# Patient Record
Sex: FEMALE | Race: WHITE | Employment: FULL TIME | URBAN - METROPOLITAN AREA
[De-identification: names, ages, dates, MRNs, and addresses within clinical notes are randomized per-mention and may not be internally consistent; named-entity substitution may affect disease eponyms.]

---

## 2018-01-17 PROBLEM — M25.551 RIGHT HIP PAIN: Status: ACTIVE | Noted: 2018-01-17

## 2018-01-17 PROBLEM — M54.41 ACUTE RIGHT-SIDED LOW BACK PAIN WITH RIGHT-SIDED SCIATICA: Status: ACTIVE | Noted: 2018-01-17

## 2019-07-08 PROBLEM — Z80.0 FAMILY HISTORY OF COLON CANCER: Status: ACTIVE | Noted: 2019-07-08

## 2019-08-22 PROBLEM — Z87.19 HISTORY OF HEMORRHOIDS: Status: ACTIVE | Noted: 2019-08-22

## 2023-05-08 ENCOUNTER — PATIENT MESSAGE (OUTPATIENT)
Dept: FAMILY MEDICINE CLINIC | Facility: CLINIC | Age: 58
End: 2023-05-08

## 2023-06-07 ENCOUNTER — OFFICE VISIT (OUTPATIENT)
Dept: FAMILY MEDICINE CLINIC | Facility: CLINIC | Age: 58
End: 2023-06-07
Payer: COMMERCIAL

## 2023-06-07 VITALS
RESPIRATION RATE: 16 BRPM | WEIGHT: 195 LBS | HEIGHT: 59.75 IN | OXYGEN SATURATION: 98 % | DIASTOLIC BLOOD PRESSURE: 82 MMHG | HEART RATE: 91 BPM | SYSTOLIC BLOOD PRESSURE: 124 MMHG | BODY MASS INDEX: 38.28 KG/M2

## 2023-06-07 DIAGNOSIS — Z23 NEED FOR DIPHTHERIA-TETANUS-PERTUSSIS (TDAP) VACCINE: ICD-10-CM

## 2023-06-07 DIAGNOSIS — Z12.31 ENCOUNTER FOR SCREENING MAMMOGRAM FOR BREAST CANCER: ICD-10-CM

## 2023-06-07 DIAGNOSIS — F17.200 VAPING NICOTINE DEPENDENCE, NON-TOBACCO PRODUCT: ICD-10-CM

## 2023-06-07 DIAGNOSIS — Z00.00 LABORATORY EXAMINATION ORDERED AS PART OF A ROUTINE GENERAL MEDICAL EXAMINATION: ICD-10-CM

## 2023-06-07 DIAGNOSIS — Z23 NEED FOR SHINGLES VACCINE: ICD-10-CM

## 2023-06-07 DIAGNOSIS — Z00.00 WELLNESS EXAMINATION: Primary | ICD-10-CM

## 2023-06-07 DIAGNOSIS — R73.09 ABNORMAL GLUCOSE: ICD-10-CM

## 2023-06-07 DIAGNOSIS — R63.2 BINGE EATING: ICD-10-CM

## 2023-06-07 PROCEDURE — 3008F BODY MASS INDEX DOCD: CPT | Performed by: FAMILY MEDICINE

## 2023-06-07 PROCEDURE — 3074F SYST BP LT 130 MM HG: CPT | Performed by: FAMILY MEDICINE

## 2023-06-07 PROCEDURE — 90472 IMMUNIZATION ADMIN EACH ADD: CPT | Performed by: FAMILY MEDICINE

## 2023-06-07 PROCEDURE — 90750 HZV VACC RECOMBINANT IM: CPT | Performed by: FAMILY MEDICINE

## 2023-06-07 PROCEDURE — 3079F DIAST BP 80-89 MM HG: CPT | Performed by: FAMILY MEDICINE

## 2023-06-07 PROCEDURE — 99386 PREV VISIT NEW AGE 40-64: CPT | Performed by: FAMILY MEDICINE

## 2023-06-07 PROCEDURE — 90471 IMMUNIZATION ADMIN: CPT | Performed by: FAMILY MEDICINE

## 2023-06-07 PROCEDURE — 90715 TDAP VACCINE 7 YRS/> IM: CPT | Performed by: FAMILY MEDICINE

## 2023-06-19 ENCOUNTER — LAB ENCOUNTER (OUTPATIENT)
Dept: LAB | Age: 58
End: 2023-06-19
Attending: FAMILY MEDICINE
Payer: COMMERCIAL

## 2023-06-19 DIAGNOSIS — Z00.00 LABORATORY EXAMINATION ORDERED AS PART OF A ROUTINE GENERAL MEDICAL EXAMINATION: ICD-10-CM

## 2023-06-19 DIAGNOSIS — R73.09 ABNORMAL GLUCOSE: ICD-10-CM

## 2023-06-19 LAB
ALBUMIN SERPL-MCNC: 3.7 G/DL (ref 3.4–5)
ALBUMIN/GLOB SERPL: 0.9 {RATIO} (ref 1–2)
ALP LIVER SERPL-CCNC: 64 U/L
ALT SERPL-CCNC: 27 U/L
ANION GAP SERPL CALC-SCNC: 3 MMOL/L (ref 0–18)
AST SERPL-CCNC: 27 U/L (ref 15–37)
BASOPHILS # BLD AUTO: 0.05 X10(3) UL (ref 0–0.2)
BASOPHILS NFR BLD AUTO: 1.1 %
BILIRUB SERPL-MCNC: 0.7 MG/DL (ref 0.1–2)
BUN BLD-MCNC: 13 MG/DL (ref 7–18)
CALCIUM BLD-MCNC: 8.6 MG/DL (ref 8.5–10.1)
CHLORIDE SERPL-SCNC: 107 MMOL/L (ref 98–112)
CHOLEST SERPL-MCNC: 180 MG/DL (ref ?–200)
CO2 SERPL-SCNC: 28 MMOL/L (ref 21–32)
CREAT BLD-MCNC: 0.95 MG/DL
EOSINOPHIL # BLD AUTO: 0.2 X10(3) UL (ref 0–0.7)
EOSINOPHIL NFR BLD AUTO: 4.6 %
ERYTHROCYTE [DISTWIDTH] IN BLOOD BY AUTOMATED COUNT: 13.6 %
EST. AVERAGE GLUCOSE BLD GHB EST-MCNC: 114 MG/DL (ref 68–126)
FASTING PATIENT LIPID ANSWER: YES
FASTING STATUS PATIENT QL REPORTED: YES
GFR SERPLBLD BASED ON 1.73 SQ M-ARVRAT: 70 ML/MIN/1.73M2 (ref 60–?)
GLOBULIN PLAS-MCNC: 3.9 G/DL (ref 2.8–4.4)
GLUCOSE BLD-MCNC: 112 MG/DL (ref 70–99)
HBA1C MFR BLD: 5.6 % (ref ?–5.7)
HCT VFR BLD AUTO: 43.2 %
HDLC SERPL-MCNC: 49 MG/DL (ref 40–59)
HGB BLD-MCNC: 14.7 G/DL
IMM GRANULOCYTES # BLD AUTO: 0.02 X10(3) UL (ref 0–1)
IMM GRANULOCYTES NFR BLD: 0.5 %
LDLC SERPL CALC-MCNC: 108 MG/DL (ref ?–100)
LYMPHOCYTES # BLD AUTO: 1.33 X10(3) UL (ref 1–4)
LYMPHOCYTES NFR BLD AUTO: 30.4 %
MCH RBC QN AUTO: 31 PG (ref 26–34)
MCHC RBC AUTO-ENTMCNC: 34 G/DL (ref 31–37)
MCV RBC AUTO: 91.1 FL
MONOCYTES # BLD AUTO: 0.51 X10(3) UL (ref 0.1–1)
MONOCYTES NFR BLD AUTO: 11.6 %
NEUTROPHILS # BLD AUTO: 2.27 X10 (3) UL (ref 1.5–7.7)
NEUTROPHILS # BLD AUTO: 2.27 X10(3) UL (ref 1.5–7.7)
NEUTROPHILS NFR BLD AUTO: 51.8 %
NONHDLC SERPL-MCNC: 131 MG/DL (ref ?–130)
OSMOLALITY SERPL CALC.SUM OF ELEC: 287 MOSM/KG (ref 275–295)
PLATELET # BLD AUTO: 182 10(3)UL (ref 150–450)
POTASSIUM SERPL-SCNC: 4.2 MMOL/L (ref 3.5–5.1)
PROT SERPL-MCNC: 7.6 G/DL (ref 6.4–8.2)
RBC # BLD AUTO: 4.74 X10(6)UL
SODIUM SERPL-SCNC: 138 MMOL/L (ref 136–145)
TRIGL SERPL-MCNC: 128 MG/DL (ref 30–149)
TSI SER-ACNC: 1.48 MIU/ML (ref 0.36–3.74)
VLDLC SERPL CALC-MCNC: 22 MG/DL (ref 0–30)
WBC # BLD AUTO: 4.4 X10(3) UL (ref 4–11)

## 2023-06-19 PROCEDURE — 80061 LIPID PANEL: CPT | Performed by: FAMILY MEDICINE

## 2023-06-19 PROCEDURE — 80050 GENERAL HEALTH PANEL: CPT | Performed by: FAMILY MEDICINE

## 2023-06-19 PROCEDURE — 83036 HEMOGLOBIN GLYCOSYLATED A1C: CPT | Performed by: FAMILY MEDICINE

## 2023-06-20 ENCOUNTER — HOSPITAL ENCOUNTER (OUTPATIENT)
Dept: MAMMOGRAPHY | Age: 58
Discharge: HOME | End: 2023-06-20
Attending: FAMILY MEDICINE
Payer: COMMERCIAL

## 2023-06-20 DIAGNOSIS — Z12.31 ENCOUNTER FOR SCREENING MAMMOGRAM FOR BREAST CANCER: ICD-10-CM

## 2023-06-20 PROCEDURE — 77067 SCR MAMMO BI INCL CAD: CPT | Performed by: FAMILY MEDICINE

## 2023-06-20 PROCEDURE — 77063 BREAST TOMOSYNTHESIS BI: CPT | Performed by: FAMILY MEDICINE

## 2023-07-18 ENCOUNTER — TELEPHONE (OUTPATIENT)
Dept: FAMILY MEDICINE CLINIC | Facility: CLINIC | Age: 58
End: 2023-07-18

## 2023-07-18 NOTE — TELEPHONE ENCOUNTER
Spoke w/ pt. Started to not feel well yesterday. Positive home COVID test today. Reports cough, congestion, body aches, fatigue, and feeling feverish. Denies SOB or CP. Supportive care provided. ER precautions reviewed. Encouraged to call back w/ other questions or concerns.

## 2023-07-18 NOTE — TELEPHONE ENCOUNTER
Pt tested positive for covid today 7/18, was feeling sick yesterday. Asking if she needs a virtual or can we just send a medication?

## 2023-10-12 ENCOUNTER — OFFICE VISIT (OUTPATIENT)
Dept: FAMILY MEDICINE CLINIC | Facility: CLINIC | Age: 58
End: 2023-10-12
Payer: COMMERCIAL

## 2023-10-12 VITALS
HEIGHT: 59.75 IN | BODY MASS INDEX: 39.85 KG/M2 | OXYGEN SATURATION: 98 % | HEART RATE: 95 BPM | SYSTOLIC BLOOD PRESSURE: 116 MMHG | RESPIRATION RATE: 16 BRPM | DIASTOLIC BLOOD PRESSURE: 68 MMHG | WEIGHT: 203 LBS

## 2023-10-12 DIAGNOSIS — R51.9 ACUTE NONINTRACTABLE HEADACHE, UNSPECIFIED HEADACHE TYPE: Primary | ICD-10-CM

## 2023-10-12 DIAGNOSIS — Z23 NEED FOR SHINGLES VACCINE: ICD-10-CM

## 2023-10-12 PROCEDURE — 3078F DIAST BP <80 MM HG: CPT | Performed by: FAMILY MEDICINE

## 2023-10-12 PROCEDURE — 3074F SYST BP LT 130 MM HG: CPT | Performed by: FAMILY MEDICINE

## 2023-10-12 PROCEDURE — 90471 IMMUNIZATION ADMIN: CPT | Performed by: FAMILY MEDICINE

## 2023-10-12 PROCEDURE — 90750 HZV VACC RECOMBINANT IM: CPT | Performed by: FAMILY MEDICINE

## 2023-10-12 PROCEDURE — 99213 OFFICE O/P EST LOW 20 MIN: CPT | Performed by: FAMILY MEDICINE

## 2023-10-12 PROCEDURE — 3008F BODY MASS INDEX DOCD: CPT | Performed by: FAMILY MEDICINE

## 2023-10-13 ENCOUNTER — TELEPHONE (OUTPATIENT)
Dept: FAMILY MEDICINE CLINIC | Facility: CLINIC | Age: 58
End: 2023-10-13

## 2023-10-13 NOTE — TELEPHONE ENCOUNTER
ART sent to [facility] fax # [phone] requesting medical records. Success confirmation was received.

## 2023-10-16 ENCOUNTER — MED REC SCAN ONLY (OUTPATIENT)
Dept: FAMILY MEDICINE CLINIC | Facility: CLINIC | Age: 58
End: 2023-10-16

## 2024-04-24 ENCOUNTER — TELEMEDICINE (OUTPATIENT)
Dept: TELEHEALTH | Age: 59
End: 2024-04-24
Payer: COMMERCIAL

## 2024-04-24 DIAGNOSIS — L23.7 POISON IVY DERMATITIS: Primary | ICD-10-CM

## 2024-04-24 PROCEDURE — 99212 OFFICE O/P EST SF 10 MIN: CPT | Performed by: PHYSICIAN ASSISTANT

## 2024-04-24 RX ORDER — TRIAMCINOLONE ACETONIDE 5 MG/G
1 CREAM TOPICAL 2 TIMES DAILY
Qty: 15 G | Refills: 0 | Status: SHIPPED | OUTPATIENT
Start: 2024-04-24

## 2024-04-24 RX ORDER — PREDNISONE 20 MG/1
40 TABLET ORAL DAILY
Qty: 10 TABLET | Refills: 0 | Status: SHIPPED | OUTPATIENT
Start: 2024-04-24 | End: 2024-04-29

## 2024-04-24 NOTE — PROGRESS NOTES
Virtual/Telephone Check-In    Myranda Turk verbally consents to a Virtual/Telephone Check-In service on 04/24/24.  Patient has been referred to the [facility] website at [email or web address] to review the yearly Consent to Treat document.  Patient understands and accepts financial responsibility for any deductible, co-insurance and/or co-pays associated with this service.       Telehealth Verbal Consent   I conducted a telehealth visit with Myranda Turk today, 04/24/24, which was completed using two-way, real-time interactive audio and video communication. This has been done in good meño to provide continuity of care in the best interest of the provider-patient relationship, due to the COVID - public health crisis/national emergency where restrictions of face-to-face office visits are ongoing. Every conscious effort was taken to allow for sufficient and adequate time to complete the visit.  The patient was made aware of the limitations of the telehealth visit, including treatment limitations as no physical exam could be performed.  The patient was advised to call 911 or to go to the ER in case there was an emergency.  The patient was also advised of the potential privacy & security concerns related to the telehealth platform.   The patient was made aware of where to find [facility]'s notice of privacy practices, telehealth consent form and other related consent forms and documents.  which are located on the [facility] website. The patient verbally agreed to telehealth consent form, related consents and the risks discussed.    Lastly, the patient confirmed that they were in [place].   Included in this visit, time may have been spent reviewing labs, medications, radiology tests and decision making. Appropriate medical decision-making and tests are ordered as detailed in the plan of care above.  Coding/billing information is submitted for this visit based on complexity of care and/or time spent for the visit.    CHIEF  COMPLAINT:  Chief Complaint   Patient presents with    Rash       HPI:  Myranda Turk is a 59 year old female who presents for a video visit.  Patient reports rash for 3 days.   Rash has been spreading since onset.  Patient has had similar rash in the past. The rash is characterized by itchy red bumps and blisters. The affected locations include L forearm and both ankles. Patient has treated rash with hydrocortisone cream and calamine lotion.  Associated symptoms include: pruritus. Denies pain or tenderness  Exposure: pt was pulling weeds outdoors 3 days ago and came into contact with poison ivy.  no pus,  no blood,  no discharge.      Pertinent negatives include no anorexia, congestion, cough, diarrhea, eye pain, facial edema, fatigue, fever, joint pain, rhinorrhea, swollen throat or tongue, shortness of breath, sore throat or vomiting.    Current Outpatient Medications   Medication Sig Dispense Refill    triamcinolone 0.5 % External Cream Apply 1 Application topically 2 (two) times daily. 15 g 0    predniSONE 20 MG Oral Tab Take 2 tablets (40 mg total) by mouth daily for 5 days. 10 tablet 0     Past Medical History:    Acute right-sided low back pain with right-sided sciatica    Family history of colon cancer    Mother    History of hemorrhoids     Past Surgical History:   Procedure Laterality Date    Colonoscopy  2019    Normal colon, hemorrhoid banding ×1    Hysterectomy        02    Tubal ligation         Social History     Socioeconomic History    Marital status:    Tobacco Use    Smoking status: Former    Smokeless tobacco: Never    Tobacco comments:     quit 2 years ago   Vaping Use    Vaping status: Every Day    Substances: Nicotine    Devices: Pre-filled or refillable cartridge   Substance and Sexual Activity    Alcohol use: Yes     Comment: socially    Drug use: No   Other Topics Concern    Caffeine Concern No    Exercise No    Seat Belt No    Special Diet No    Stress Concern No     Weight Concern No        REVIEW OF SYSTEMS:  GENERAL: feels well otherwise, no fever, no chills.  SKIN: Per HPI. No edema. No ulcerations.  HEENT: Denies rhinorrhea, edema of the lips or swelling of throat.  CARDIOVASCULAR: Denies chest pains or palpitations.  LUNGS: Denies shortness of breath with exertion or rest. No cough or wheezing.  LYMPH: Denies enlargement of the lymph nodes.  NEURO: Denies abnormal sensation, tingling of the skin, or numbness.    EXAM:  General: Alert, Well-appearing, and In no acute distress  Respiratory:   Speaking in full sentences comfortably  Normal work of breathing  No cough during visit  Head: Normocephalic  Nose: No obvious nasal discharge.  Skin: erythematous papulovesicular rash noted to small area on L forearm and bilateral ankles. No facial swelling.     No results found for this or any previous visit (from the past 24 hour(s)).    ASSESSMENT AND PLAN:  Myranda Turk is a 59 year old female who presents with symptoms that are consistent with    ASSESSMENT:   Encounter Diagnosis   Name Primary?    Poison ivy dermatitis Yes       PLAN: Meds as below.  Discussed use of cream first since rash is localized to those areas. If worsening or spreading my start oral steroid. See patient Instructions    Meds & Refills for this Visit:  Requested Prescriptions     Signed Prescriptions Disp Refills    triamcinolone 0.5 % External Cream 15 g 0     Sig: Apply 1 Application topically 2 (two) times daily.    predniSONE 20 MG Oral Tab 10 tablet 0     Sig: Take 2 tablets (40 mg total) by mouth daily for 5 days.       Risks, benefits, and side effects of medication explained and discussed.    There are no Patient Instructions on file for this visit.    The patient indicates understanding of these issues and agrees to the plan.  The patient is asked to f/u with PCP or Derm if sx's persist or worsen.    Myranda Turk understands video visit evaluation is not a substitute for face-to-face  examination or emergency care. Patient advised to go to ER or call 911 for worsening symptoms or acute distress.

## 2025-07-21 ENCOUNTER — OFFICE VISIT (OUTPATIENT)
Dept: FAMILY MEDICINE CLINIC | Facility: CLINIC | Age: 60
End: 2025-07-21
Payer: COMMERCIAL

## 2025-07-21 VITALS
DIASTOLIC BLOOD PRESSURE: 76 MMHG | HEART RATE: 87 BPM | HEIGHT: 59.75 IN | BODY MASS INDEX: 40.44 KG/M2 | RESPIRATION RATE: 18 BRPM | WEIGHT: 206 LBS | OXYGEN SATURATION: 97 % | SYSTOLIC BLOOD PRESSURE: 126 MMHG

## 2025-07-21 DIAGNOSIS — M65.30 TRIGGER FINGER OF RIGHT HAND, UNSPECIFIED FINGER: ICD-10-CM

## 2025-07-21 DIAGNOSIS — Z12.11 SCREEN FOR COLON CANCER: ICD-10-CM

## 2025-07-21 DIAGNOSIS — Z00.00 LABORATORY EXAM ORDERED AS PART OF ROUTINE GENERAL MEDICAL EXAMINATION: ICD-10-CM

## 2025-07-21 DIAGNOSIS — Z23 NEED FOR PNEUMOCOCCAL 20-VALENT CONJUGATE VACCINATION: ICD-10-CM

## 2025-07-21 DIAGNOSIS — M25.812 IMPINGEMENT OF LEFT SHOULDER: ICD-10-CM

## 2025-07-21 DIAGNOSIS — Z00.00 WELLNESS EXAMINATION: Primary | ICD-10-CM

## 2025-07-21 DIAGNOSIS — F17.201 TOBACCO DEPENDENCE IN REMISSION: ICD-10-CM

## 2025-07-21 DIAGNOSIS — Z12.31 SCREENING MAMMOGRAM FOR BREAST CANCER: ICD-10-CM

## 2025-07-21 RX ORDER — NAPROXEN 500 MG/1
500 TABLET ORAL 2 TIMES DAILY WITH MEALS
Qty: 60 TABLET | Refills: 0 | Status: SHIPPED | OUTPATIENT
Start: 2025-07-21

## 2025-07-21 NOTE — PATIENT INSTRUCTIONS
Health Screening Guidelines, Women Ages 50 to 64   Screening tests are key to managing your health. A screening test is done to find problems in people who don't have any symptoms. Screening tests are not used to diagnose. They are used to find out if more testing is needed. The goal may be to find a disease early so it can be treated with more success. Or the goal may be to find a disease early so you can make lifestyle changes.   Below are guidelines for women ages 50 to 64. Work with your healthcare provider. Make sure you’re up-to-date on what you need.   Screening  Who needs it  How often    Type 2 diabetes or prediabetes  All women in this age group who are overweight or obese, or had gestational diabetes  At least every 3 years    Type 2 diabetes All women with prediabetes  Every year   Unhealthy alcohol use  All women in this age group  At routine exams   Blood pressure All women in this age group  Once a year if your blood pressure is normal. Normal blood pressure is less than 120/80 mm Hg. If your blood pressure is higher than this, follow the advice of your healthcare provider.    Breast cancer All women in this age group at average risk. Expert groups vary on their advice so talk with your provider about your specific situation.      A mammogram is advised every 1 or 2 years. Talk with your provider about your risk factors. Ask how often you need one.       The U.S. Preventive Services Task Force advises a mammogram every 2 years starting at age 40.  The American Cancer Society advises yearly mammograms for women through ages 45 to 54 and mammograms every 1 to 2 years for women ages 55 and older.      All women should know how their breasts normally look and feel. They should know the benefits and risks of breast cancer screening with mammograms.    Cervical cancer All women in this age group, unless they have had a complete hysterectomy  Primary HPV test every 5 years, a co-test (an HPV test with a  Pap test) every 5 years, or a Pap test every 3 years. Talk with your healthcare provider about your risks and whether you need screening more often.    Chlamydia Women who are sexually active and at higher risk of infection  At yearly routine exams    Colorectal cancer All women in this age group at average risk  Talk with your healthcare provider about which test below is right for you:   Flexible sigmoidoscopy every 5 years  Colonoscopy every 10 years  CT colonography (virtual colonoscopy) every 5 years  Yearly fecal occult blood test  Yearly fecal immunochemical test (FIT)  Stool DNA with FIT test every 3 years  If you have a test that is not a colonoscopy and have an abnormal test result, you will need a colonoscopy.   You may need to be screened more or less often. This is based on personal or family health history. Talk with your healthcare provider.    Depression All adults At routine exams, including, all pregnant and postpartum women    Gonorrhea Sexually active women who are at higher risk of infection  At yearly routine exams    Hepatitis C All adults At routine exams   High cholesterol or triglycerides  All adults     At least every 5 years up to age 55.  Women ages 55 to 65 should be screened every 1 to 2 years.  Talk with your healthcare provider about your risk and how often to get screened.    HIV All adults At least once between the ages of 13 and 64. Women at ongoing risk should be screened more often. Talk with your healthcare provider about your risk and how often to be screened.    Lung cancer All women in this age group who are in fairly good health, are at higher risk for lung cancer, and who:       Smoke or used to smoke  Have a 20-pack- per year smoking history (1 pack a day for 20 years or 2 packs a day for 10 years)      Expert groups vary in their advice. Talk with your healthcare provider.  Yearly lung cancer screening with a low-dose CT scan (LDCT). Talk with your healthcare provider.     Obesity All adults At routine exams   Osteoporosis Women who are postmenopausal  Talk with your healthcare provider.    Syphilis Women who are at higher risk of infection.  Talk with your healthcare provider.    Tuberculosis Women who are at higher risk of infection  Talk with your healthcare provider.    Vision All adults At least every 1 to 2 years or as directed by your healthcare provider.    Health counseling Who needs it How often   BRCA gene mutation testing for breast and ovarian cancer susceptibility  Women who are at higher risk of having this gene mutation. Talk with your healthcare provider.  When your risk is known    Breast cancer and chemoprevention  Women who are at high risk for breast cancer. Talk with your healthcare provider.  When your risk is known    Diet and exercise Women who are overweight or obese  When diagnosed, and then at routine exams    Sexually transmitted infection (STI) prevention  Women who are at higher risk of infection. Talk with your healthcare provider.  At routine exams   Use of tobacco and the health effects it can cause  All adults Every exam   Mikaela last reviewed this educational content on 5/1/2024  This information is for informational purposes only. This is not intended to be a substitute for professional medical advice, diagnosis, or treatment. Always seek the advice and follow the directions from your physician or other qualified health care provider.  © 6683-0547 [employer]. All rights reserved. This information is not intended as a substitute for professional medical care. Always follow your healthcare professional's instructions.

## 2025-07-21 NOTE — PROGRESS NOTES
The following individual(s) verbally consented to be recorded using ambient AI listening technology and understand that they can each withdraw their consent to this listening technology at any point by asking the clinician to turn off or pause the recording:    Patient name: Myranda Turk  Additional names:

## 2025-07-25 NOTE — PROGRESS NOTES
Subjective:   Myranda Turk is a 60 year old female who presents for Physical, Hand Pain (Right hand, pain started 2 months ago. Middle/ring fingers are stuck in bent position in the am ), and Shoulder Pain (Left shoulder , pain started 1 week ago. Loss of  ROM. Pain is less severe at this time. )       History/Other:   History of Present Illness  Myranda Turk is a 60 year old female who presents for annual physical and orthopedic issues involving her fingers and left shoulder pain.    She has been experiencing issues with her fingers, particularly the right hand ring finger, which is painful and exhibits a 'trigger finger' phenomenon. This condition has been ongoing, with the finger catching and being difficult to straighten, causing significant discomfort. Her middle finger was also affected but has since resolved. Symptoms are worse in the morning, possibly due to sleeping with her fist clenched.    She reports significant pain in her left shoulder, ongoing for a few days. She has difficulty raising her arm and experiences pain when using the shoulder muscles. No trauma or specific physical activity could have caused the pain. She wonders if it may have resulted from sleeping in an awkward position. She has been taking Aleve every twelve hours to manage the pain.    She is attempting to quit smoking using a nicotine patch and is trying to improve her diet and exercise habits. She tries to stay active by walking her dog or doing yard work and opts for healthier snacks like cut vegetables instead of potato chips.     Chief Complaint Reviewed and Verified  Nursing Notes Reviewed and   Verified  Tobacco Reviewed  Allergies Reviewed  Medications Reviewed    Problem List Reviewed  Medical History Reviewed  Surgical History   Reviewed  OB Status Reviewed  Family History Reviewed  Social History   Reviewed         Tobacco:  She smoked tobacco in the past but quit greater than 12 months ago.  Tobacco Use[1]      Current Medications[2]    PHQ-2 SCORE: 0  , done 7/21/2025     Review of Systems:  Pertinent items are noted in HPI.    Objective:   /76   Pulse 87   Resp 18   Ht 4' 11.75\" (1.518 m)   Wt 206 lb (93.4 kg)   SpO2 97%   BMI 40.57 kg/m²  Estimated body mass index is 40.57 kg/m² as calculated from the following:    Height as of this encounter: 4' 11.75\" (1.518 m).    Weight as of this encounter: 206 lb (93.4 kg).  Physical Exam  GENERAL: Alert, cooperative, well developed, no acute distress  HEENT: Normocephalic, normal oropharynx, moist mucous membranes  CHEST: Clear to auscultation bilaterally, no wheezes, rhonchi, or crackles  CARDIOVASCULAR: Normal heart rate and rhythm, S1 and S2 normal without murmurs  ABDOMEN: Soft, non-tender, non-distended, without organomegaly, normal bowel sounds  EXTREMITIES: No cyanosis or edema  MUSCULOSKELETAL: Hand without abnormalities. Limited range of motion in left shoulder with pain on elevation. Strength 5/5 b/l UE.  strength 5/5 b/l.   NEUROLOGICAL: Cranial nerves grossly intact, moves all extremities without gross motor or sensory deficit      Assessment & Plan:   1. Wellness examination (Primary)  -Immunizations: PCV20 due today  -Metabolic: BMI 40. BP wnl. Due for annual labs   -Cancer screening: due for mammo, CRC screening.   -Communicable disease: low risk   -Mental health: no concerns   -Other preventative: follow with dentistry and optometry.   -Lifestyle: Follow a well balanced healthy diet with emphasis on fruits, vegetables, whole grains, lean meats. Limit processed and junk foods. Aim for at least 150 minutes of moderate intensity exercise weekly. Make sure you are staying adequately hydrated. Aim to get 7-9 hours of sleep nightly.     2. Laboratory exam ordered as part of routine general medical examination  -     CBC With Differential With Platelet; Future; Expected date: 07/21/2025  -     Comp Metabolic Panel (14); Future; Expected date:  07/21/2025  -     Lipid Panel; Future; Expected date: 07/21/2025  -     TSH W Reflex To Free T4; Future; Expected date: 07/21/2025    3. Screening mammogram for breast cancer  -     [facility]LEY 2D+3D SCREENING BILAT (CPT=77067/70309); Future; Expected date: 07/21/2025    4. Screen for colon cancer  -     SURGERY - INTERNAL    5. Need for pneumococcal 20-valent conjugate vaccination  -     PCV20 VACCINE FOR INTRAMUSCULAR USE    6. Impingement of left shoulder  Acute left shoulder pain likely due to muscle strain. Aleve provided some improvement. Prescription-strength naproxen recommended. Further imaging if symptoms persist.  - Prescribe prescription-strength naproxen.  - Advise continuation of Aleve every 12 hours as needed.  - Provide exercises for shoulder mobility.  - Consider further imaging if pain persists.  - Consider referral to physical therapy if no improvement  -     Naproxen; Take 1 tablet (500 mg total) by mouth 2 (two) times daily with meals.  Dispense: 60 tablet; Refill: 0    7. Trigger finger of right hand, unspecified finger  - Refer to orthopedic hand specialist for evaluation and possible cortisone injection.  - Consider surgical intervention if cortisone injections are ineffective.  - Advise use of a brace or splint at night.  - Recommend icing and buddy taping if needed.   -     Ortho Referral - In Network  -     Naproxen; Take 1 tablet (500 mg total) by mouth 2 (two) times daily with meals.  Dispense: 60 tablet; Refill: 0    8. Tobacco dependence in remission  Doing well, using patch. CPM.       Return in about 1 year (around 7/21/2026).        Basim Hardin MD, 7/25/2025, 11:35 AM             [1]   Social History  Tobacco Use   Smoking Status Former   Smokeless Tobacco Former   Tobacco Comments    Using the patch now   [2]   Current Outpatient Medications   Medication Sig Dispense Refill    naproxen 500 MG Oral Tab Take 1 tablet (500 mg total) by mouth 2 (two) times daily with meals. 60  tablet 0

## 2025-08-19 ENCOUNTER — PATIENT MESSAGE (OUTPATIENT)
Dept: FAMILY MEDICINE CLINIC | Facility: CLINIC | Age: 60
End: 2025-08-19

## 2025-08-27 ENCOUNTER — LAB ENCOUNTER (OUTPATIENT)
Dept: LAB | Age: 60
End: 2025-08-27
Attending: FAMILY MEDICINE

## 2025-08-27 DIAGNOSIS — Z00.00 LABORATORY EXAM ORDERED AS PART OF ROUTINE GENERAL MEDICAL EXAMINATION: ICD-10-CM

## 2025-08-27 LAB
ALBUMIN SERPL-MCNC: 4.5 G/DL (ref 3.2–4.8)
ALBUMIN/GLOB SERPL: 1.4 (ref 1–2)
ALP LIVER SERPL-CCNC: 55 U/L (ref 46–118)
ALT SERPL-CCNC: 36 U/L (ref 10–49)
ANION GAP SERPL CALC-SCNC: 8 MMOL/L (ref 0–18)
AST SERPL-CCNC: 28 U/L (ref ?–34)
BASOPHILS # BLD AUTO: 0.05 X10(3) UL (ref 0–0.2)
BASOPHILS NFR BLD AUTO: 1.3 %
BILIRUB SERPL-MCNC: 0.7 MG/DL (ref 0.3–1.2)
BUN BLD-MCNC: 12 MG/DL (ref 9–23)
CALCIUM BLD-MCNC: 9.3 MG/DL (ref 8.7–10.6)
CHLORIDE SERPL-SCNC: 103 MMOL/L (ref 98–112)
CHOLEST SERPL-MCNC: 197 MG/DL (ref ?–200)
CO2 SERPL-SCNC: 29 MMOL/L (ref 21–32)
CREAT BLD-MCNC: 0.75 MG/DL (ref 0.55–1.02)
EGFRCR SERPLBLD CKD-EPI 2021: 92 ML/MIN/1.73M2 (ref 60–?)
EOSINOPHIL # BLD AUTO: 0.21 X10(3) UL (ref 0–0.7)
EOSINOPHIL NFR BLD AUTO: 5.3 %
ERYTHROCYTE [DISTWIDTH] IN BLOOD BY AUTOMATED COUNT: 13.7 %
FASTING PATIENT LIPID ANSWER: YES
FASTING STATUS PATIENT QL REPORTED: YES
GLOBULIN PLAS-MCNC: 3.2 G/DL (ref 2–3.5)
GLUCOSE BLD-MCNC: 121 MG/DL (ref 70–99)
HCT VFR BLD AUTO: 42.9 % (ref 35–48)
HDLC SERPL-MCNC: 45 MG/DL (ref 40–59)
HGB BLD-MCNC: 14.6 G/DL (ref 12–16)
IMM GRANULOCYTES # BLD AUTO: 0.03 X10(3) UL (ref 0–1)
IMM GRANULOCYTES NFR BLD: 0.8 %
LDLC SERPL CALC-MCNC: 136 MG/DL (ref ?–100)
LYMPHOCYTES # BLD AUTO: 1.54 X10(3) UL (ref 1–4)
LYMPHOCYTES NFR BLD AUTO: 38.6 %
MCH RBC QN AUTO: 31.4 PG (ref 26–34)
MCHC RBC AUTO-ENTMCNC: 34 G/DL (ref 31–37)
MCV RBC AUTO: 92.3 FL (ref 80–100)
MONOCYTES # BLD AUTO: 0.48 X10(3) UL (ref 0.1–1)
MONOCYTES NFR BLD AUTO: 12 %
NEUTROPHILS # BLD AUTO: 1.68 X10 (3) UL (ref 1.5–7.7)
NEUTROPHILS # BLD AUTO: 1.68 X10(3) UL (ref 1.5–7.7)
NEUTROPHILS NFR BLD AUTO: 42 %
NONHDLC SERPL-MCNC: 152 MG/DL (ref ?–130)
OSMOLALITY SERPL CALC.SUM OF ELEC: 291 MOSM/KG (ref 275–295)
PLATELET # BLD AUTO: 198 10(3)UL (ref 150–450)
POTASSIUM SERPL-SCNC: 4.2 MMOL/L (ref 3.5–5.1)
PROT SERPL-MCNC: 7.7 G/DL (ref 5.7–8.2)
RBC # BLD AUTO: 4.65 X10(6)UL (ref 3.8–5.3)
SODIUM SERPL-SCNC: 140 MMOL/L (ref 136–145)
TRIGL SERPL-MCNC: 86 MG/DL (ref 30–149)
TSI SER-ACNC: 1.15 UIU/ML (ref 0.55–4.78)
VLDLC SERPL CALC-MCNC: 16 MG/DL (ref 0–30)
WBC # BLD AUTO: 4 X10(3) UL (ref 4–11)

## 2025-08-27 PROCEDURE — 80061 LIPID PANEL: CPT | Performed by: FAMILY MEDICINE

## 2025-08-27 PROCEDURE — 80050 GENERAL HEALTH PANEL: CPT | Performed by: FAMILY MEDICINE
